# Patient Record
Sex: MALE | Race: BLACK OR AFRICAN AMERICAN | NOT HISPANIC OR LATINO | URBAN - NONMETROPOLITAN AREA
[De-identification: names, ages, dates, MRNs, and addresses within clinical notes are randomized per-mention and may not be internally consistent; named-entity substitution may affect disease eponyms.]

---

## 2017-07-12 PROBLEM — H35.371: Noted: 2018-01-24

## 2017-07-12 PROBLEM — H25.13: Noted: 2017-07-12

## 2017-07-12 PROBLEM — H40.1133: Noted: 2021-04-08

## 2017-07-12 PROBLEM — H04.123: Noted: 2018-01-24

## 2017-07-12 PROBLEM — H40.1134: Noted: 2017-01-10

## 2019-03-05 NOTE — PATIENT DISCUSSION
Surgery Counseling: I have discussed the option of scheduling surgery versus following, as well as the risks, benefits and alternatives of cataract surgery with the patient. It was explained that the surgery is medically indicated at this time, and it can be performed at the patient's option as delaying will cause no further deterioration, therefore there is no rush and there is no harm in waiting to have surgery. It was also explained that there is no guarantee that removing the cataract will improve their vision. The patient understands and desires to proceed with cataract surgery with the implantation of an intraocular lens to improve vision for __reading / driving__. I have given the patient the prescribed regimen of the all-in-one drop to use before and after cataract surgery. They have elected to use the all-in-one option of Pred/Gati/Brom(prednisolone acetate,gatifloxacin,and bromfenac. Patient to administer as directed.

## 2019-03-05 NOTE — PATIENT DISCUSSION
CATARACTS, OU - VISUALLY SIGNIFICANT. SCHEDULE _os_ FIRST THEN LATER IN _od_ DISCUSSED OPTION OF __STD OU______________VS __MULTIFOCAL OU________________. PATIENT UNDERSTANDS AND DESIRES ______STD OU_______________________.

## 2019-03-15 NOTE — PATIENT DISCUSSION
Continue: prednisolone acetate (prednisolone acetate): drops,suspension: 1% 1 drop four times a day into left eye 03-

## 2019-06-25 NOTE — PATIENT DISCUSSION
Stopped Today: prednisolone acetate (prednisolone acetate): drops,suspension: 1% 1 drop four times a day into left eye 03-

## 2019-08-28 ENCOUNTER — IMPORTED ENCOUNTER (OUTPATIENT)
Dept: URBAN - NONMETROPOLITAN AREA CLINIC 1 | Facility: CLINIC | Age: 59
End: 2019-08-28

## 2019-08-28 PROCEDURE — 92014 COMPRE OPH EXAM EST PT 1/>: CPT

## 2019-08-28 NOTE — PATIENT DISCUSSION
COAG OU stable- IOPs stable-Continue to monitorrefer to dr. Diaz Held for further f/u per pt requestCataract OU +progression -Not yet surgical. -Reviewed symptoms of advancing cataract growth such as glare and halos and decreased vision.-Continue to monitor for now. Pt will notify us if any new symptoms develop. ANA-Explained ANA and associated symptoms.-Recommend increasing Omega 3s.-Pt to begin artificial tears OU QID PRN. Pt will contact us if this does not provide relief. Consider punctal plugs in that case. Epiretinal membrane -trace OD-Discussed findings of exam in detail with the patient.-Discussed the signs of worsening of the disease. Mile Dunlap

## 2020-12-03 ENCOUNTER — IMPORTED ENCOUNTER (OUTPATIENT)
Dept: URBAN - NONMETROPOLITAN AREA CLINIC 1 | Facility: CLINIC | Age: 60
End: 2020-12-03

## 2020-12-03 PROCEDURE — 92015 DETERMINE REFRACTIVE STATE: CPT

## 2020-12-03 PROCEDURE — 99213 OFFICE O/P EST LOW 20 MIN: CPT

## 2020-12-03 NOTE — PATIENT DISCUSSION
COAG OU stable- Discussed diagnosis in detail w/ patient - Discussed signs and symptoms associated - IOP 16 OD and 18 OS- S/P Trab OU - Patient not currently on any eye drops - Cup to disc: 0.8 OU - Order VF 24-2 and dilated examCataract OU +progression - Not yet surgical. - Reviewed symptoms of advancing cataract growth such as glare and halos and decreased vision.- Continue to monitor for now. Pt will notify us if any new symptoms develop. ANA- Explained ANA and associated symptoms.- Recommend using AT's throughout the day; samples givne in office todayCompound Hyperopic Astig w/ Presbyopia - New spectacle Rx issued

## 2021-04-08 ENCOUNTER — IMPORTED ENCOUNTER (OUTPATIENT)
Dept: URBAN - NONMETROPOLITAN AREA CLINIC 1 | Facility: CLINIC | Age: 61
End: 2021-04-08

## 2021-04-08 PROCEDURE — 99213 OFFICE O/P EST LOW 20 MIN: CPT

## 2021-04-08 PROCEDURE — 92083 EXTENDED VISUAL FIELD XM: CPT

## 2021-04-08 NOTE — PATIENT DISCUSSION
COAG OU stable- Discussed diagnosis in detail w/ patient - Discussed signs and symptoms associated - IOP 17 OD and 18 OS- S/P Trab OU - Patient not currently on any eye drops - Cup to disc: 0.9 OU - VF done today: constriction OU. Discussed with patient to be very cautious since his peripheral vision is limited. Stressed to patient to turn his head when looking around to make sure he doesn't miss anything. Patient states understanding- RTC 4-6 months for follow up IOP CheckCataract OU +progression - Not yet surgical. - Reviewed symptoms of advancing cataract growth such as glare and halos and decreased vision.- Continue to monitor for now. Pt will notify us if any new symptoms develop. ANA- Explained ANA and associated symptoms.- Recommend using AT's throughout the day; samples given in office today

## 2021-08-19 ENCOUNTER — IMPORTED ENCOUNTER (OUTPATIENT)
Dept: URBAN - NONMETROPOLITAN AREA CLINIC 1 | Facility: CLINIC | Age: 61
End: 2021-08-19

## 2021-08-19 PROCEDURE — 92012 INTRM OPH EXAM EST PATIENT: CPT

## 2021-08-19 NOTE — PATIENT DISCUSSION
COAG OU stable- Discussed diagnosis in detail w/ patient - Discussed signs and symptoms associated - IOP 17 OD and 18 OS- S/P Trab OU - Patient not currently on any eye dropsCataract OU +progression - Not yet surgical. - Reviewed symptoms of advancing cataract growth such as glare and halos and decreased vision.- Continue to monitor for now. Pt will notify us if any new symptoms develop. ANA- Explained ANA and associated symptoms.- Recommend using AT's throughout the day; samples given in office todayRTC 4 month IOP check

## 2021-12-23 ENCOUNTER — PREPPED CHART (OUTPATIENT)
Dept: RURAL CLINIC 1 | Facility: CLINIC | Age: 61
End: 2021-12-23

## 2021-12-23 ENCOUNTER — IMPORTED ENCOUNTER (OUTPATIENT)
Dept: URBAN - NONMETROPOLITAN AREA CLINIC 1 | Facility: CLINIC | Age: 61
End: 2021-12-23

## 2021-12-23 PROCEDURE — 92012 INTRM OPH EXAM EST PATIENT: CPT

## 2021-12-23 NOTE — PATIENT DISCUSSION
COAG OU stable- Discussed diagnosis in detail w/ patient - Discussed signs and symptoms associated - IOP 17 OU- S/P Trab OU - Patient not currently on any eye dropsCataract OU +progression - Not yet surgical. - Reviewed symptoms of advancing cataract growth such as glare and halos and decreased vision.- Continue to monitor for now. Pt will notify us if any new symptoms develop. ANA- Explained ANA and associated symptoms.- Recommend using AT's throughout the day; samples given in office todayRTC 4 month IOP check  and VF

## 2022-02-24 ENCOUNTER — EMERGENCY VISIT (OUTPATIENT)
Dept: RURAL CLINIC 1 | Facility: CLINIC | Age: 62
End: 2022-02-24

## 2022-02-24 DIAGNOSIS — H40.1133: ICD-10-CM

## 2022-02-24 DIAGNOSIS — T81.31XA: ICD-10-CM

## 2022-02-24 PROCEDURE — 92012 INTRM OPH EXAM EST PATIENT: CPT

## 2022-02-24 RX ORDER — HYDROXYZINE HYDROCHLORIDE 25 MG/1: 1 TABLET, FILM COATED ORAL

## 2022-02-24 RX ORDER — BACITRACIN ZINC AND POLYMYXIN B SULFATE 500; 10000 [USP'U]/G; [USP'U]/G: 1/2 OINTMENT OPHTHALMIC EVERY EVENING

## 2022-02-24 ASSESSMENT — VISUAL ACUITY
OS_SC: 20/40+2
OD_PH: 20/30+2
OD_SC: 20/80-2

## 2022-02-24 ASSESSMENT — TONOMETRY
OS_IOP_MMHG: 16
OD_IOP_MMHG: 5

## 2022-02-28 ENCOUNTER — FOLLOW UP (OUTPATIENT)
Dept: RURAL CLINIC 1 | Facility: CLINIC | Age: 62
End: 2022-02-28

## 2022-02-28 DIAGNOSIS — T81.31XA: ICD-10-CM

## 2022-02-28 DIAGNOSIS — H40.1133: ICD-10-CM

## 2022-02-28 PROCEDURE — 92012 INTRM OPH EXAM EST PATIENT: CPT

## 2022-02-28 ASSESSMENT — VISUAL ACUITY
OD_SC: 20/200-1
OS_SC: 20/60-1
OD_SC: 20/80-1
OS_SC: 20/40

## 2022-02-28 ASSESSMENT — TONOMETRY
OD_IOP_MMHG: 6
OS_IOP_MMHG: 16

## 2022-02-28 NOTE — PATIENT DISCUSSION
continue poly/prateek/emily QHS OD. continue Besivance TID OD until gone then start gentamicin TID OD.

## 2022-03-24 ENCOUNTER — FOLLOW UP (OUTPATIENT)
Dept: RURAL CLINIC 1 | Facility: CLINIC | Age: 62
End: 2022-03-24

## 2022-03-24 DIAGNOSIS — H40.1133: ICD-10-CM

## 2022-03-24 DIAGNOSIS — Z98.890: ICD-10-CM

## 2022-03-24 DIAGNOSIS — T81.31XA: ICD-10-CM

## 2022-03-24 PROCEDURE — 99213 OFFICE O/P EST LOW 20 MIN: CPT

## 2022-03-24 ASSESSMENT — TONOMETRY
OS_IOP_MMHG: 15
OD_IOP_MMHG: 11

## 2022-03-24 ASSESSMENT — VISUAL ACUITY
OS_SC: 20/30
OD_SC: 20/60
OD_PH: 20/30-2

## 2022-03-24 NOTE — PATIENT DISCUSSION
Discussed surgical intervention to reduce leak. Recommend Bleb revision. Will need medical clearance.

## 2022-03-31 ENCOUNTER — PRE-OP/H&P (OUTPATIENT)
Dept: RURAL CLINIC 1 | Facility: CLINIC | Age: 62
End: 2022-03-31

## 2022-03-31 VITALS
DIASTOLIC BLOOD PRESSURE: 89 MMHG | HEART RATE: 55 BPM | SYSTOLIC BLOOD PRESSURE: 155 MMHG | HEIGHT: 72 IN | BODY MASS INDEX: 30.48 KG/M2 | WEIGHT: 225 LBS

## 2022-03-31 DIAGNOSIS — H40.1133: ICD-10-CM

## 2022-03-31 DIAGNOSIS — Z01.818: ICD-10-CM

## 2022-03-31 DIAGNOSIS — T81.31XA: ICD-10-CM

## 2022-03-31 PROCEDURE — 99499 UNLISTED E&M SERVICE: CPT

## 2022-03-31 NOTE — PATIENT DISCUSSION
Discussed surgical intervention to reduce leak. Recommend Bleb revision with conjunctival advancement OD.

## 2022-03-31 NOTE — PATIENT DISCUSSION
Medical clearance done today. No outstanding concerns that would preclude surgery. Patient cleared to proceed with scheduled surgery.

## 2022-04-06 ENCOUNTER — POST-OP (OUTPATIENT)
Dept: RURAL CLINIC 1 | Facility: CLINIC | Age: 62
End: 2022-04-06

## 2022-04-06 DIAGNOSIS — Z98.890: ICD-10-CM

## 2022-04-06 DIAGNOSIS — Z01.818: ICD-10-CM

## 2022-04-06 DIAGNOSIS — T81.31XA: ICD-10-CM

## 2022-04-06 PROCEDURE — 99024 POSTOP FOLLOW-UP VISIT: CPT

## 2022-04-06 ASSESSMENT — TONOMETRY: OD_IOP_MMHG: 11

## 2022-04-06 ASSESSMENT — VISUAL ACUITY
OS_SC: 20/400
OU_SC: 20/30-1
OD_SC: 20/80
OU_SC: 20/40
OD_SC: 20/200
OS_SC: 20/30+2

## 2022-04-09 ASSESSMENT — TONOMETRY
OS_IOP_MMHG: 18
OD_IOP_MMHG: 17
OS_IOP_MMHG: 18
OD_IOP_MMHG: 18
OD_IOP_MMHG: 17
OS_IOP_MMHG: 17
OS_IOP_MMHG: 18
OS_IOP_MMHG: 18
OD_IOP_MMHG: 17
OD_IOP_MMHG: 16

## 2022-04-09 ASSESSMENT — VISUAL ACUITY
OD_PH: 20/40
OD_CC: 20/40-1
OD_PH: 20/30-1
OU_SC: 20/30
OD_CC: 20/70-1
OD_SC: 20/40
OU_CC: 20/30-1
OS_CC: 20/30
OS_CC: 20/30-2
OD_CC: 20/80-2
OS_SC: 20/30
OS_SC: 20/30+2
OU_SC: 20/30
OD_SC: 20/30
OS_CC: 20/40
OD_PH: 20/40

## 2022-04-11 ENCOUNTER — POST-OP (OUTPATIENT)
Dept: RURAL CLINIC 1 | Facility: CLINIC | Age: 62
End: 2022-04-11

## 2022-04-11 DIAGNOSIS — Z98.890: ICD-10-CM

## 2022-04-11 PROCEDURE — 99024 POSTOP FOLLOW-UP VISIT: CPT

## 2022-04-11 ASSESSMENT — VISUAL ACUITY
OD_SC: 20/40
OU_SC: 20/70
OS_SC: 20/30-2

## 2022-04-11 ASSESSMENT — TONOMETRY: OD_IOP_MMHG: 13

## 2022-04-21 ENCOUNTER — EMERGENCY VISIT (OUTPATIENT)
Dept: RURAL CLINIC 1 | Facility: CLINIC | Age: 62
End: 2022-04-21

## 2022-04-21 DIAGNOSIS — T81.31XA: ICD-10-CM

## 2022-04-21 DIAGNOSIS — Z98.890: ICD-10-CM

## 2022-04-21 PROCEDURE — 99024 POSTOP FOLLOW-UP VISIT: CPT

## 2022-04-21 ASSESSMENT — TONOMETRY
OD_IOP_MMHG: 12
OS_IOP_MMHG: 15

## 2022-04-21 ASSESSMENT — VISUAL ACUITY
OS_SC: 20/30-2
OD_PH: 20/30
OD_SC: 20/40

## 2022-05-05 ENCOUNTER — POST-OP (OUTPATIENT)
Dept: RURAL CLINIC 1 | Facility: CLINIC | Age: 62
End: 2022-05-05

## 2022-05-05 DIAGNOSIS — T81.31XD: ICD-10-CM

## 2022-05-05 DIAGNOSIS — Z98.890: ICD-10-CM

## 2022-05-05 PROCEDURE — 99024 POSTOP FOLLOW-UP VISIT: CPT

## 2022-05-05 RX ORDER — BRIMONIDINE TARTRATE 1.5 MG/ML: 1 SOLUTION/ DROPS OPHTHALMIC

## 2022-05-05 ASSESSMENT — TONOMETRY
OD_IOP_MMHG: 18
OS_IOP_MMHG: 14

## 2022-05-05 ASSESSMENT — VISUAL ACUITY
OD_SC: 20/60
OS_SC: 20/40

## 2022-05-25 NOTE — PATIENT DISCUSSION
Progressive - Daily wearOD-0.25+0.5035+2.453423/30&nbsp;SN &nbsp; &nbsp; deann lovenox for dvt at ppx dosing for now

## 2022-06-06 ENCOUNTER — FOLLOW UP (OUTPATIENT)
Dept: RURAL CLINIC 1 | Facility: CLINIC | Age: 62
End: 2022-06-06

## 2022-06-06 DIAGNOSIS — T81.31XD: ICD-10-CM

## 2022-06-06 DIAGNOSIS — Z98.890: ICD-10-CM

## 2022-06-06 PROCEDURE — 92012 INTRM OPH EXAM EST PATIENT: CPT

## 2022-06-06 ASSESSMENT — VISUAL ACUITY
OD_CC: 20/50
OD_PH: 20/25
OU_CC: 20/30
OS_CC: 20/40-1
OD_CC: 20/60-1
OS_CC: 20/30
OU_CC: 20/25

## 2022-06-06 ASSESSMENT — TONOMETRY
OD_IOP_MMHG: 17
OS_IOP_MMHG: 16

## 2022-07-18 ENCOUNTER — FOLLOW UP (OUTPATIENT)
Dept: RURAL CLINIC 1 | Facility: CLINIC | Age: 62
End: 2022-07-18

## 2022-07-18 DIAGNOSIS — H40.1133: ICD-10-CM

## 2022-07-18 DIAGNOSIS — Z98.890: ICD-10-CM

## 2022-07-18 PROCEDURE — 92014 COMPRE OPH EXAM EST PT 1/>: CPT

## 2022-07-18 ASSESSMENT — VISUAL ACUITY
OD_CC: 20/40
OS_CC: 20/25

## 2022-07-18 ASSESSMENT — TONOMETRY
OS_IOP_MMHG: 20
OD_IOP_MMHG: 20

## 2023-01-09 ENCOUNTER — FOLLOW UP (OUTPATIENT)
Dept: RURAL CLINIC 1 | Facility: CLINIC | Age: 63
End: 2023-01-09

## 2023-01-09 DIAGNOSIS — H25.13: ICD-10-CM

## 2023-01-09 DIAGNOSIS — H40.1133: ICD-10-CM

## 2023-01-09 PROCEDURE — 92012 INTRM OPH EXAM EST PATIENT: CPT

## 2023-01-09 ASSESSMENT — VISUAL ACUITY
OU_CC: 20/25-2
OD_CC: 20/40
OU_CC: 20/30
OS_CC: 20/25-1
OD_PH: 20/40

## 2023-01-09 ASSESSMENT — TONOMETRY
OS_IOP_MMHG: 19
OD_IOP_MMHG: 23
OS_IOP_MMHG: 23

## 2023-06-19 ENCOUNTER — FOLLOW UP (OUTPATIENT)
Dept: RURAL CLINIC 1 | Facility: CLINIC | Age: 63
End: 2023-06-19

## 2023-06-19 DIAGNOSIS — H25.13: ICD-10-CM

## 2023-06-19 DIAGNOSIS — H40.1133: ICD-10-CM

## 2023-06-19 PROCEDURE — 99213 OFFICE O/P EST LOW 20 MIN: CPT

## 2023-06-19 PROCEDURE — 92083 EXTENDED VISUAL FIELD XM: CPT

## 2023-06-19 RX ORDER — LATANOPROSTENE BUNOD 0.24 MG/ML
1 SOLUTION/ DROPS OPHTHALMIC EVERY EVENING
Start: 2023-06-19

## 2023-06-19 ASSESSMENT — TONOMETRY
OD_IOP_MMHG: 26
OS_IOP_MMHG: 18

## 2023-06-19 ASSESSMENT — VISUAL ACUITY
OD_PH: 20/30-1
OU_SC: 20/30-2
OU_SC: 20/40
OD_SC: 20/50-1
OS_SC: 20/30-1

## 2023-07-24 ENCOUNTER — FOLLOW UP (OUTPATIENT)
Dept: RURAL CLINIC 1 | Facility: CLINIC | Age: 63
End: 2023-07-24

## 2023-07-24 DIAGNOSIS — H40.1133: ICD-10-CM

## 2023-07-24 PROCEDURE — 99214 OFFICE O/P EST MOD 30 MIN: CPT

## 2023-07-24 RX ORDER — DORZOLAMIDE HYDROCHLORIDE TIMOLOL MALEATE 20; 5 MG/ML; MG/ML
1 SOLUTION/ DROPS OPHTHALMIC TWICE A DAY
Start: 2023-07-24

## 2023-07-24 ASSESSMENT — TONOMETRY
OD_IOP_MMHG: 23
OS_IOP_MMHG: 18

## 2023-07-24 ASSESSMENT — VISUAL ACUITY
OD_SC: 20/60+1
OD_PH: 20/30
OS_SC: 20/40+1

## 2023-09-14 ENCOUNTER — FOLLOW UP (OUTPATIENT)
Dept: RURAL CLINIC 1 | Facility: CLINIC | Age: 63
End: 2023-09-14

## 2023-09-14 DIAGNOSIS — H40.1133: ICD-10-CM

## 2023-09-14 DIAGNOSIS — H25.13: ICD-10-CM

## 2023-09-14 PROCEDURE — 99212 OFFICE O/P EST SF 10 MIN: CPT

## 2023-09-14 ASSESSMENT — TONOMETRY
OS_IOP_MMHG: 15
OD_IOP_MMHG: 18

## 2023-09-14 ASSESSMENT — VISUAL ACUITY
OD_SC: 20/60-1
OS_SC: 20/40+2
OU_SC: 20/40+1

## 2024-01-29 ENCOUNTER — FOLLOW UP (OUTPATIENT)
Dept: RURAL CLINIC 1 | Facility: CLINIC | Age: 64
End: 2024-01-29

## 2024-01-29 DIAGNOSIS — H25.13: ICD-10-CM

## 2024-01-29 DIAGNOSIS — H40.1133: ICD-10-CM

## 2024-01-29 PROCEDURE — 99213 OFFICE O/P EST LOW 20 MIN: CPT

## 2024-01-29 RX ORDER — LOTEPREDNOL ETABONATE 3.8 MG/G
1 GEL OPHTHALMIC TWICE A DAY
Start: 2024-01-29

## 2024-01-29 ASSESSMENT — VISUAL ACUITY
OD_SC: 20/30
OD_SC: 20/60
OU_SC: 20/40
OS_SC: 20/30
OU_SC: 20/30
OS_SC: 20/40+1

## 2024-01-29 ASSESSMENT — TONOMETRY
OD_IOP_MMHG: 19
OS_IOP_MMHG: 15

## 2024-02-26 ENCOUNTER — FOLLOW UP (OUTPATIENT)
Dept: RURAL CLINIC 1 | Facility: CLINIC | Age: 64
End: 2024-02-26

## 2024-02-26 DIAGNOSIS — H40.1133: ICD-10-CM

## 2024-02-26 PROCEDURE — 99213 OFFICE O/P EST LOW 20 MIN: CPT

## 2024-02-26 PROCEDURE — 92133 CPTRZD OPH DX IMG PST SGM ON: CPT

## 2024-02-26 ASSESSMENT — VISUAL ACUITY
OD_SC: 20/60
OS_SC: 20/30

## 2024-02-26 ASSESSMENT — TONOMETRY: OS_IOP_MMHG: 18

## 2024-03-11 ENCOUNTER — FOLLOW UP (OUTPATIENT)
Dept: RURAL CLINIC 1 | Facility: CLINIC | Age: 64
End: 2024-03-11

## 2024-03-11 DIAGNOSIS — H25.13: ICD-10-CM

## 2024-03-11 DIAGNOSIS — H40.1133: ICD-10-CM

## 2024-03-11 PROCEDURE — 99213 OFFICE O/P EST LOW 20 MIN: CPT

## 2024-03-11 ASSESSMENT — TONOMETRY
OD_IOP_MMHG: 18
OS_IOP_MMHG: 13
OD_IOP_MMHG: 16
OS_IOP_MMHG: 14

## 2024-03-11 ASSESSMENT — VISUAL ACUITY
OS_SC: 20/40+1
OU_SC: 20/70
OD_SC: 20/70+1
OU_SC: 20/30
OD_SC: 20/70
OS_SC: 20/70

## 2024-04-22 ENCOUNTER — CONSULTATION/EVALUATION (OUTPATIENT)
Dept: RURAL CLINIC 1 | Facility: CLINIC | Age: 64
End: 2024-04-22

## 2024-04-22 DIAGNOSIS — H25.813: ICD-10-CM

## 2024-04-22 DIAGNOSIS — H40.1133: ICD-10-CM

## 2024-04-22 DIAGNOSIS — H35.371: ICD-10-CM

## 2024-04-22 PROCEDURE — 92025 CPTRIZED CORNEAL TOPOGRAPHY: CPT | Mod: NC

## 2024-04-22 PROCEDURE — 99214 OFFICE O/P EST MOD 30 MIN: CPT

## 2024-04-22 PROCEDURE — 92134 CPTRZ OPH DX IMG PST SGM RTA: CPT | Mod: NC

## 2024-04-22 PROCEDURE — 92136 OPHTHALMIC BIOMETRY: CPT | Mod: NC

## 2024-04-22 ASSESSMENT — VISUAL ACUITY
OD_PAM: 20/40
OS_SC: 20/70
OD_PH: 20/30-1
OS_PH: 20/25-2
OD_SC: 20/200
OS_SC: 20/30-2
OS_AM: 20/40
OD_SC: 20/60

## 2024-04-22 ASSESSMENT — TONOMETRY: OS_IOP_MMHG: 11

## 2024-05-06 ENCOUNTER — PRE-OP/H&P (OUTPATIENT)
Dept: RURAL CLINIC 1 | Facility: CLINIC | Age: 64
End: 2024-05-06

## 2024-05-06 VITALS
HEART RATE: 53 BPM | DIASTOLIC BLOOD PRESSURE: 79 MMHG | WEIGHT: 243 LBS | BODY MASS INDEX: 32.91 KG/M2 | HEIGHT: 72 IN | SYSTOLIC BLOOD PRESSURE: 120 MMHG

## 2024-05-06 DIAGNOSIS — Z01.818: ICD-10-CM

## 2024-05-06 PROCEDURE — 99499 UNLISTED E&M SERVICE: CPT

## 2024-05-21 ENCOUNTER — SURGERY/PROCEDURE (OUTPATIENT)
Dept: URBAN - METROPOLITAN AREA SURGERY 3 | Facility: SURGERY | Age: 64
End: 2024-05-21

## 2024-05-21 DIAGNOSIS — H40.1113: ICD-10-CM

## 2024-05-21 DIAGNOSIS — H25.811: ICD-10-CM

## 2024-05-21 PROCEDURE — 66185 REVISE AQUEOUS SHUNT EYE: CPT

## 2024-05-21 PROCEDURE — 66984 XCAPSL CTRC RMVL W/O ECP: CPT

## 2024-05-22 ENCOUNTER — POST-OP (OUTPATIENT)
Dept: RURAL CLINIC 1 | Facility: CLINIC | Age: 64
End: 2024-05-22

## 2024-05-22 DIAGNOSIS — Z96.1: ICD-10-CM

## 2024-05-22 PROCEDURE — 99024 POSTOP FOLLOW-UP VISIT: CPT

## 2024-05-22 RX ORDER — PREDNISOLONE ACETATE 10 MG/ML: 1 SUSPENSION/ DROPS OPHTHALMIC

## 2024-05-22 RX ORDER — CIPROFLOXACIN 3 MG/ML: 1 SOLUTION OPHTHALMIC

## 2024-05-22 RX ORDER — NEOMYCIN SULFATE, POLYMYXIN B SULFATE AND DEXAMETHASONE 3.5; 10000; 1 MG/G; [USP'U]/G; MG/G: OINTMENT OPHTHALMIC EVERY EVENING

## 2024-05-22 ASSESSMENT — VISUAL ACUITY
OD_PH: 20/40
OD_SC: 20/50+2
OS_SC: 20/30-2

## 2024-05-22 ASSESSMENT — TONOMETRY
OS_IOP_MMHG: 14
OD_IOP_MMHG: 15

## 2024-05-23 ENCOUNTER — POST-OP (OUTPATIENT)
Dept: RURAL CLINIC 1 | Facility: CLINIC | Age: 64
End: 2024-05-23

## 2024-05-23 DIAGNOSIS — Z96.1: ICD-10-CM

## 2024-05-23 PROCEDURE — 66999PO NON CO-MANAGED OTHER SURGERY PO

## 2024-05-23 ASSESSMENT — VISUAL ACUITY
OD_PH: 20/25-1
OD_SC: 20/40

## 2024-05-23 ASSESSMENT — TONOMETRY
OS_IOP_MMHG: 15
OD_IOP_MMHG: 15

## 2024-05-30 ENCOUNTER — POST-OP (OUTPATIENT)
Dept: RURAL CLINIC 1 | Facility: CLINIC | Age: 64
End: 2024-05-30

## 2024-05-30 DIAGNOSIS — Z96.1: ICD-10-CM

## 2024-05-30 ASSESSMENT — VISUAL ACUITY
OD_SC: 20/50-1
OS_SC: 20/25

## 2024-05-30 ASSESSMENT — TONOMETRY
OS_IOP_MMHG: 12
OD_IOP_MMHG: 06
OD_IOP_MMHG: 08

## 2024-06-10 ENCOUNTER — POST-OP (OUTPATIENT)
Dept: RURAL CLINIC 1 | Facility: CLINIC | Age: 64
End: 2024-06-10

## 2024-06-10 DIAGNOSIS — Z96.1: ICD-10-CM

## 2024-06-10 PROCEDURE — 66999PO NON CO-MANAGED OTHER SURGERY PO

## 2024-06-10 ASSESSMENT — TONOMETRY
OS_IOP_MMHG: 12
OD_IOP_MMHG: 05

## 2024-06-10 ASSESSMENT — VISUAL ACUITY
OU_SC: 20/25-2
OS_SC: 20/25-2
OD_SC: 20/30+2

## 2024-07-08 ENCOUNTER — POST-OP (OUTPATIENT)
Dept: RURAL CLINIC 1 | Facility: CLINIC | Age: 64
End: 2024-07-08

## 2024-07-08 DIAGNOSIS — Z96.1: ICD-10-CM

## 2024-07-08 PROCEDURE — 66999PO NON CO-MANAGED OTHER SURGERY PO

## 2024-07-08 ASSESSMENT — VISUAL ACUITY
OD_SC: 20/40
OU_SC: 20/25-1
OS_SC: 20/25-1

## 2024-07-08 ASSESSMENT — TONOMETRY
OS_IOP_MMHG: 12
OD_IOP_MMHG: 6

## 2024-07-29 ENCOUNTER — EMERGENCY VISIT (OUTPATIENT)
Dept: RURAL CLINIC 1 | Facility: CLINIC | Age: 64
End: 2024-07-29

## 2024-07-29 DIAGNOSIS — Z96.1: ICD-10-CM

## 2024-07-29 DIAGNOSIS — H40.1133: ICD-10-CM

## 2024-07-29 PROCEDURE — 92012 INTRM OPH EXAM EST PATIENT: CPT

## 2024-07-29 ASSESSMENT — VISUAL ACUITY
OD_SC: 20/50+2
OD_PH: 20/30-2
OS_SC: 20/30+2

## 2024-07-29 ASSESSMENT — TONOMETRY
OS_IOP_MMHG: 07
OS_IOP_MMHG: 14
OD_IOP_MMHG: 07
OD_IOP_MMHG: 24

## 2024-08-05 ENCOUNTER — POST-OP (OUTPATIENT)
Dept: RURAL CLINIC 1 | Facility: CLINIC | Age: 64
End: 2024-08-05

## 2024-08-05 DIAGNOSIS — Z96.1: ICD-10-CM

## 2024-08-05 PROCEDURE — 66999PO NON CO-MANAGED OTHER SURGERY PO

## 2024-08-05 ASSESSMENT — TONOMETRY
OD_IOP_MMHG: 13
OD_IOP_MMHG: 11
OS_IOP_MMHG: 12

## 2024-08-05 ASSESSMENT — VISUAL ACUITY
OD_SC: 20/30-2
OS_SC: 20/40

## 2024-09-09 ENCOUNTER — POST-OP (OUTPATIENT)
Dept: RURAL CLINIC 1 | Facility: CLINIC | Age: 64
End: 2024-09-09

## 2024-09-09 DIAGNOSIS — Z96.1: ICD-10-CM

## 2024-09-09 PROCEDURE — 66999PO NON CO-MANAGED OTHER SURGERY PO

## 2024-10-14 ENCOUNTER — FOLLOW UP (OUTPATIENT)
Dept: RURAL CLINIC 1 | Facility: CLINIC | Age: 64
End: 2024-10-14

## 2024-10-14 DIAGNOSIS — H26.491: ICD-10-CM

## 2024-10-14 DIAGNOSIS — H40.1133: ICD-10-CM

## 2024-10-14 DIAGNOSIS — H25.12: ICD-10-CM

## 2024-10-14 DIAGNOSIS — H35.371: ICD-10-CM

## 2024-10-14 DIAGNOSIS — Z98.890: ICD-10-CM

## 2024-10-14 PROCEDURE — 99213 OFFICE O/P EST LOW 20 MIN: CPT

## 2025-01-20 ENCOUNTER — FOLLOW UP (OUTPATIENT)
Age: 65
End: 2025-01-20

## 2025-01-20 DIAGNOSIS — Z98.890: ICD-10-CM

## 2025-01-20 DIAGNOSIS — H35.371: ICD-10-CM

## 2025-01-20 DIAGNOSIS — H40.1133: ICD-10-CM

## 2025-01-20 DIAGNOSIS — H25.12: ICD-10-CM

## 2025-01-20 DIAGNOSIS — H26.491: ICD-10-CM

## 2025-01-20 PROCEDURE — 92012 INTRM OPH EXAM EST PATIENT: CPT

## 2025-07-28 ENCOUNTER — FOLLOW UP (OUTPATIENT)
Age: 65
End: 2025-07-28

## 2025-07-28 DIAGNOSIS — Z98.890: ICD-10-CM

## 2025-07-28 DIAGNOSIS — H26.491: ICD-10-CM

## 2025-07-28 DIAGNOSIS — H40.1133: ICD-10-CM

## 2025-07-28 DIAGNOSIS — H35.371: ICD-10-CM

## 2025-07-28 DIAGNOSIS — H25.12: ICD-10-CM

## 2025-07-28 PROCEDURE — 99214 OFFICE O/P EST MOD 30 MIN: CPT

## 2025-07-28 PROCEDURE — 92083 EXTENDED VISUAL FIELD XM: CPT
